# Patient Record
(demographics unavailable — no encounter records)

---

## 2017-10-18 NOTE — ED PDOC
HPI: Chest Pain


Time Seen by Provider: 10/18/17 15:50


Chief Complaint (Nursing): Chest Pain


Chief Complaint (Provider): Urticaria


History Per: Patient, Family


Additional Complaint(s): 





19 yo female, no PMH, presents to ED from PMDs office for evaluation of rash, 

chest pain and shortness of breath today. Pt laughing and speaking in full 

sentences with writer, % on RA. 


Patient reports having  allergic reaction 2 days ago and breaking out in hives. 

Pt was seen by PMD and given and injection of Benadryl. 


Pt reports this am the rash returned. 


Pt sent home on  Levocetirizine and Prednisone yesterday, prednisone increased 

to 10mg today.








Pt can not identify any triggering factors, no new foods, lotions detergents or 

medications





Past Medical History


Reviewed: Nursing Documentation, Vital Signs


Vital Signs: 


 Last Vital Signs











Temp  98.2 F   10/18/17 18:30


 


Pulse  75   10/18/17 18:30


 


Resp  14 L  10/18/17 18:30


 


BP  124/75   10/18/17 18:30


 


Pulse Ox  100   10/18/17 18:30














- Medical History


PMH: No Chronic Diseases





- Surgical History


Surgical History: No Surg Hx





- Family History


Family History: States: No Known Family Hx





- Living Arrangements


Living Arrangements: With Family





- Social History


Current smoker - smoking cessation education provided: No


Alcohol: None


Drugs: Denies





- Home Medications


Home Medications: 


 Ambulatory Orders











 Medication  Instructions  Recorded


 


Methylprednisolone [Medrol Dose 4 mg PO DAILY #21 mg 10/18/17





Pack (21 tabs)]  














- Allergies


Allergies/Adverse Reactions: 


 Allergies











Allergy/AdvReac Type Severity Reaction Status Date / Time


 


No Known Allergies Allergy   Verified 10/18/17 15:41














Review of Systems


ROS Statement: Except As Marked, All Systems Reviewed And Found Negative


Skin: Positive for: Rash





Physical Exam





- Reviewed


Nursing Documentation Reviewed: Yes


Vital Signs Reviewed: Yes





- Physical Exam


Appears: Positive for: Well, Non-toxic, No Acute Distress


Head Exam: Positive for: ATRAUMATIC, NORMAL INSPECTION, NORMOCEPHALIC


Skin: Positive for: Normal Color, Warm, Rash (maculopapular rash to face and 

upper extremities)


Eye Exam: Positive for: EOMI, Normal appearance, PERRL


ENT: Positive for: Normal ENT Inspection


Neck: Positive for: Normal, Painless ROM


Cardiovascular/Chest: Positive for: Regular Rate, Rhythm


Respiratory: Positive for: CNT, Normal Breath Sounds


Gastrointestinal/Abdominal: Positive for: Normal Exam, Bowel Sounds, Soft


Back: Positive for: Normal Inspection


Extremity: Positive for: Normal ROM


Neurologic/Psych: Positive for: Alert, Oriented





- ECG


O2 Sat by Pulse Oximetry: 100





Medical Decision Making


Medical Decision Making: 





IV access established and treatment initiated with Solumedrol Benadryl and 

pepcid. 


On re-eval, full resolution of rash





no labored breathing. chest pain or palpitaitons. Pt tolerated PO food tray











Disposition





- Clinical Impression


Clinical Impression: 


 Urticaria








- Patient ED Disposition


Is Patient to be Admitted: No





- Disposition


Disposition: Routine/Home


Disposition Time: 19:38


Condition: STABLE


Prescriptions: 


Methylprednisolone [Medrol Dose Pack (21 tabs)] 4 mg PO DAILY #21 mg


Instructions:  Urticaria (ED)


Forms:  [facility] (English)

## 2017-10-19 NOTE — ED PDOC
HPI: Allergic Reaction


Time Seen by Provider: 10/19/17 20:46


Chief Complaint (Nursing): Allergic Reaction


Chief Complaint (Provider): possible allergic reaction


History Per: Patient


History/Exam Limitations: no limitations


Onset/Duration Of Symptoms: Days (2)


Additional Complaint(s): 





Patient is an 17 y/o female with no significant past medical history presenting 

to the emergency department for an itchy rash on her arms and face that started 

yesterday. Reports taking steroids prescribed to her by her PCP which provided 

no significant relief. Does not know cause of allergic reaction. Denies 

shortness of breath, vomiting, nausea, fever, abdominal pain, swelling to upper 

or lower extremities, or any other complaints.





Of note, patient was seen in the ED yesterday for hives and was treated with 

Benadryl and Pepcid, which resolved symptoms. She was also given prescriptions 

for Levocetirizin and Prednisone.





PCP: Dr. Jose Garcia





Past Medical History


Reviewed: Historical Data, Nursing Documentation, Vital Signs


Vital Signs: 


 Last Vital Signs











Temp  98.2 F   10/19/17 20:14


 


Pulse  72   10/19/17 20:14


 


Resp  16   10/19/17 20:14


 


BP  128/72   10/19/17 20:14


 


Pulse Ox  100   10/19/17 20:14














- Medical History


PMH: No Chronic Diseases





- Family History


Family History: States: Unknown Family Hx





- Living Arrangements


Living Arrangements: With Family





- Home Medications


Home Medications: 


 Ambulatory Orders











 Medication  Instructions  Recorded


 


Methylprednisolone [Medrol Dose 4 mg PO DAILY #21 mg 10/18/17





Pack (21 tabs)]  


 


DiphenhydrAMINE [Benadryl] 25 mg PO Q6 #30 cap 10/19/17














- Allergies


Allergies/Adverse Reactions: 


 Allergies











Allergy/AdvReac Type Severity Reaction Status Date / Time


 


No Known Allergies Allergy   Verified 10/18/17 15:41














Review of Systems


ROS Statement: Except As Marked, All Systems Reviewed And Found Negative


Respiratory: Negative for: Shortness of Breath


Gastrointestinal: Negative for: Nausea, Vomiting, Abdominal Pain


Musculoskeletal: Positive for: Other (swelling to upper or lower extremities)


Skin: Positive for: Rash (bilateral arms and face)





Physical Exam





- Reviewed


Nursing Documentation Reviewed: Yes


Vital Signs Reviewed: Yes





- Physical Exam


Appears: Positive for: No Acute Distress


Head Exam: Positive for: ATRAUMATIC, NORMAL INSPECTION, NORMOCEPHALIC


Skin: Positive for: Warm, Dry, Rash (diffuse hives)


Eye Exam: Positive for: Normal appearance.  Negative for: Periorbital swelling


ENT: Positive for: Other (no swelling to uvula or lips. normal tonsils).  

Negative for: Tonsillar Exudate, Tonsillar Swelling


Neck: Positive for: Normal


Cardiovascular/Chest: Positive for: Regular Rate, Rhythm


Respiratory: Negative for: Accessory Muscle Use, Respiratory Distress


Extremity: Positive for: Normal ROM


Neurologic/Psych: Positive for: Alert, Oriented (x3)





- ECG


O2 Sat by Pulse Oximetry: 100 (RA)


Pulse Ox Interpretation: Normal





Disposition





- Clinical Impression


Clinical Impression: 


 Urticaria








- Patient ED Disposition


Is Patient to be Admitted: No


Counseled Patient/Family Regarding: Studies Performed, Diagnosis, Need For 

Followup





- Disposition


Referrals: 


[facility] [Provider Group]


Disposition: Routine/Home


Disposition Time: 21:40


Condition: STABLE


Prescriptions: 


DiphenhydrAMINE [Benadryl] 25 mg PO Q6 #30 cap


Instructions:  Food Allergy (ED)





Medical Decision Making


Medical Decision Making: 


Time: 21:02





Initial Impression: Allergic reaction





Initial plan:





* Benadryl 25 mg IM





21:15


Patient was advised to follow up with PCP for persistent flare up of an unknown 

allergy. There is no need for admission at this time. There is no evidence of 

anaphylaxis. Patient's current condition does not warrant steroids or 

epinephrine. Vital signs are normal. Aside from itching, she is non-toxic and 

has no signs of respiratory distress. pt requires allergy consultation. 

continue steroid use





--------------------------------------------------------------------------------

-----------------~


Scribe Attestation:


Documented by Zunilda Mera, acting as a scribe for MAUDE Murguia.





Provider Scribe Attestation:


All medical record entries made by the Scribe were at my direction and 

personally dictated by me. I have reviewed the chart and agree that the record 

accurately reflects my personal performance of the history, physical exam, 

medical decision making, and the department course for this patient. I have 

also personally directed, reviewed, and agree with the discharge instructions 

and disposition.